# Patient Record
Sex: FEMALE | Race: WHITE
[De-identification: names, ages, dates, MRNs, and addresses within clinical notes are randomized per-mention and may not be internally consistent; named-entity substitution may affect disease eponyms.]

---

## 2018-02-15 ENCOUNTER — HOSPITAL ENCOUNTER (INPATIENT)
Dept: HOSPITAL 83 - ED | Age: 68
LOS: 2 days | Discharge: HOME | DRG: 871 | End: 2018-02-17
Attending: INTERNAL MEDICINE | Admitting: INTERNAL MEDICINE
Payer: MEDICARE

## 2018-02-15 VITALS — BODY MASS INDEX: 38.13 KG/M2 | WEIGHT: 215.19 LBS | HEIGHT: 62.99 IN

## 2018-02-15 VITALS — DIASTOLIC BLOOD PRESSURE: 58 MMHG | SYSTOLIC BLOOD PRESSURE: 187 MMHG

## 2018-02-15 VITALS — DIASTOLIC BLOOD PRESSURE: 69 MMHG

## 2018-02-15 VITALS — SYSTOLIC BLOOD PRESSURE: 171 MMHG | DIASTOLIC BLOOD PRESSURE: 68 MMHG

## 2018-02-15 VITALS — DIASTOLIC BLOOD PRESSURE: 81 MMHG | SYSTOLIC BLOOD PRESSURE: 166 MMHG

## 2018-02-15 VITALS — DIASTOLIC BLOOD PRESSURE: 68 MMHG

## 2018-02-15 VITALS — DIASTOLIC BLOOD PRESSURE: 76 MMHG

## 2018-02-15 VITALS — DIASTOLIC BLOOD PRESSURE: 59 MMHG

## 2018-02-15 DIAGNOSIS — Z79.4: ICD-10-CM

## 2018-02-15 DIAGNOSIS — E11.42: ICD-10-CM

## 2018-02-15 DIAGNOSIS — E80.6: ICD-10-CM

## 2018-02-15 DIAGNOSIS — E66.01: ICD-10-CM

## 2018-02-15 DIAGNOSIS — E83.51: ICD-10-CM

## 2018-02-15 DIAGNOSIS — Z88.9: ICD-10-CM

## 2018-02-15 DIAGNOSIS — E53.9: ICD-10-CM

## 2018-02-15 DIAGNOSIS — E78.5: ICD-10-CM

## 2018-02-15 DIAGNOSIS — A41.9: Primary | ICD-10-CM

## 2018-02-15 DIAGNOSIS — K21.9: ICD-10-CM

## 2018-02-15 DIAGNOSIS — K52.9: ICD-10-CM

## 2018-02-15 DIAGNOSIS — Z96.622: ICD-10-CM

## 2018-02-15 DIAGNOSIS — Z88.0: ICD-10-CM

## 2018-02-15 DIAGNOSIS — E11.43: ICD-10-CM

## 2018-02-15 DIAGNOSIS — E53.8: ICD-10-CM

## 2018-02-15 DIAGNOSIS — K31.84: ICD-10-CM

## 2018-02-15 DIAGNOSIS — Z98.891: ICD-10-CM

## 2018-02-15 DIAGNOSIS — I11.0: ICD-10-CM

## 2018-02-15 DIAGNOSIS — D72.810: ICD-10-CM

## 2018-02-15 DIAGNOSIS — Z83.3: ICD-10-CM

## 2018-02-15 DIAGNOSIS — E78.1: ICD-10-CM

## 2018-02-15 DIAGNOSIS — R81: ICD-10-CM

## 2018-02-15 DIAGNOSIS — I50.32: ICD-10-CM

## 2018-02-15 DIAGNOSIS — R82.4: ICD-10-CM

## 2018-02-15 DIAGNOSIS — K29.71: ICD-10-CM

## 2018-02-15 DIAGNOSIS — Z90.49: ICD-10-CM

## 2018-02-15 DIAGNOSIS — I31.3: ICD-10-CM

## 2018-02-15 DIAGNOSIS — R80.9: ICD-10-CM

## 2018-02-15 DIAGNOSIS — E43: ICD-10-CM

## 2018-02-15 DIAGNOSIS — D72.0: ICD-10-CM

## 2018-02-15 LAB
ALBUMIN SERPL-MCNC: 3.3 GM/DL (ref 3.1–4.5)
ALP SERPL-CCNC: 91 U/L (ref 45–117)
ALT SERPL W P-5'-P-CCNC: 27 U/L (ref 12–78)
APPEARANCE UR: (no result)
AST SERPL-CCNC: 33 IU/L (ref 3–35)
BACTERIA #/AREA URNS HPF: (no result) /[HPF]
BASOPHILS # BLD AUTO: 0.1 10*3/UL (ref 0–0.1)
BASOPHILS NFR BLD AUTO: 0.5 % (ref 0–1)
BILIRUB UR QL STRIP: NEGATIVE
BUN SERPL-MCNC: 15 MG/DL (ref 7–24)
CHLORIDE SERPL-SCNC: 104 MMOL/L (ref 98–107)
COLOR UR: YELLOW
CREAT SERPL-MCNC: 0.92 MG/DL (ref 0.55–1.02)
EOSINOPHIL # BLD AUTO: 0 10*3/UL (ref 0–0.4)
EOSINOPHIL # BLD AUTO: 0.2 % (ref 1–4)
EPI CELLS #/AREA URNS HPF: (no result) /[HPF]
ERYTHROCYTE [DISTWIDTH] IN BLOOD BY AUTOMATED COUNT: 12.4 % (ref 0–14.5)
GLUCOSE UR QL: (no result)
HCT VFR BLD AUTO: 39.6 % (ref 37–47)
HGB BLD-MCNC: 13.6 G/DL (ref 12–16)
HGB UR QL STRIP: (no result)
KETONES UR QL STRIP: (no result)
LEUKOCYTE ESTERASE UR QL STRIP: NEGATIVE
LIPASE SERPL-CCNC: 90 U/L (ref 73–393)
LYMPHOCYTES # BLD AUTO: 2.1 10*3/UL (ref 1.3–4.4)
LYMPHOCYTES NFR BLD AUTO: 19.1 % (ref 27–41)
MCH RBC QN AUTO: 28.6 PG (ref 27–31)
MCHC RBC AUTO-ENTMCNC: 34.3 G/DL (ref 33–37)
MCV RBC AUTO: 83.2 FL (ref 81–99)
MONOCYTES # BLD AUTO: 0.3 10*3/UL (ref 0.1–1)
MONOCYTES NFR BLD MANUAL: 3.1 % (ref 3–9)
NEUT #: 8.3 10*3/UL (ref 2.3–7.9)
NEUT %: 76.8 % (ref 47–73)
NITRITE UR QL STRIP: NEGATIVE
NRBC BLD QL AUTO: 0 % (ref 0–0)
PH UR STRIP: 7 [PH] (ref 5–9)
PLATELET # BLD AUTO: 198 10*3/UL (ref 130–400)
PMV BLD AUTO: 10.9 FL (ref 9.6–12.3)
POTASSIUM SERPL-SCNC: 4.6 MMOL/L (ref 3.5–5.1)
PROT SERPL-MCNC: 7 GM/DL (ref 6.4–8.2)
RBC # BLD AUTO: 4.76 10*6/UL (ref 4.1–5.1)
SODIUM SERPL-SCNC: 139 MMOL/L (ref 136–145)
SP GR UR: 1.02 (ref 1–1.03)
UROBILINOGEN UR STRIP-MCNC: 0.2 E.U./DL (ref 0.2–1)
WBC #/AREA URNS HPF: (no result) WBC/HPF (ref 0–5)
WBC NRBC COR # BLD AUTO: 10.8 10*3/UL (ref 4.8–10.8)

## 2018-02-16 VITALS — DIASTOLIC BLOOD PRESSURE: 49 MMHG | SYSTOLIC BLOOD PRESSURE: 156 MMHG

## 2018-02-16 VITALS — SYSTOLIC BLOOD PRESSURE: 162 MMHG | DIASTOLIC BLOOD PRESSURE: 70 MMHG

## 2018-02-16 VITALS — SYSTOLIC BLOOD PRESSURE: 158 MMHG | DIASTOLIC BLOOD PRESSURE: 58 MMHG

## 2018-02-16 VITALS — SYSTOLIC BLOOD PRESSURE: 142 MMHG | DIASTOLIC BLOOD PRESSURE: 67 MMHG

## 2018-02-16 VITALS — SYSTOLIC BLOOD PRESSURE: 161 MMHG | DIASTOLIC BLOOD PRESSURE: 55 MMHG

## 2018-02-16 VITALS — DIASTOLIC BLOOD PRESSURE: 92 MMHG

## 2018-02-16 VITALS — DIASTOLIC BLOOD PRESSURE: 52 MMHG

## 2018-02-16 LAB
25(OH)D3 SERPL-MCNC: 8.5 NG/ML (ref 30–100)
ALBUMIN SERPL-MCNC: 2.7 GM/DL (ref 3.1–4.5)
ALP SERPL-CCNC: 69 U/L (ref 45–117)
ALT SERPL W P-5'-P-CCNC: 19 U/L (ref 12–78)
APTT PPP: 23.9 SECONDS (ref 20.8–31.5)
AST SERPL-CCNC: 18 IU/L (ref 3–35)
BASOPHILS # BLD AUTO: 0.1 10*3/UL (ref 0–0.1)
BASOPHILS NFR BLD AUTO: 0.6 % (ref 0–1)
BUN SERPL-MCNC: 18 MG/DL (ref 7–24)
CHLORIDE SERPL-SCNC: 108 MMOL/L (ref 98–107)
CHOLEST SERPL-MCNC: 192 MG/DL (ref ?–200)
CREAT SERPL-MCNC: 0.93 MG/DL (ref 0.55–1.02)
EOSINOPHIL # BLD AUTO: 0.1 10*3/UL (ref 0–0.4)
EOSINOPHIL # BLD AUTO: 0.7 % (ref 1–4)
ERYTHROCYTE [DISTWIDTH] IN BLOOD BY AUTOMATED COUNT: 12.4 % (ref 0–14.5)
HCT VFR BLD AUTO: 33.6 % (ref 37–47)
HDLC SERPL-MCNC: 63 MG/DL (ref 40–60)
HGB BLD-MCNC: 11.8 G/DL (ref 12–16)
INR BLD: 1 (ref 2–3.5)
LDLC SERPL DIRECT ASSAY-MCNC: 87 MG/DL (ref 9–159)
LIPASE SERPL-CCNC: 91 U/L (ref 73–393)
LYMPHOCYTES # BLD AUTO: 3 10*3/UL (ref 1.3–4.4)
LYMPHOCYTES NFR BLD AUTO: 29.7 % (ref 27–41)
MCH RBC QN AUTO: 29.4 PG (ref 27–31)
MCHC RBC AUTO-ENTMCNC: 35.1 G/DL (ref 33–37)
MCV RBC AUTO: 83.8 FL (ref 81–99)
MONOCYTES # BLD AUTO: 0.7 10*3/UL (ref 0.1–1)
MONOCYTES NFR BLD MANUAL: 7.2 % (ref 3–9)
NEUT #: 6.2 10*3/UL (ref 2.3–7.9)
NEUT %: 61.6 % (ref 47–73)
NRBC BLD QL AUTO: 0 10*3/UL (ref 0–0)
PHOSPHATE SERPL-MCNC: 2.4 MG/DL (ref 2.5–4.9)
PLATELET # BLD AUTO: 170 10*3/UL (ref 130–400)
PMV BLD AUTO: 10.8 FL (ref 9.6–12.3)
POTASSIUM SERPL-SCNC: 3.5 MMOL/L (ref 3.5–5.1)
PROT SERPL-MCNC: 5.7 GM/DL (ref 6.4–8.2)
RBC # BLD AUTO: 4.01 10*6/UL (ref 4.1–5.1)
SODIUM SERPL-SCNC: 144 MMOL/L (ref 136–145)
T4 FREE SERPL-MCNC: 1.11 NG/DL (ref 0.76–1.46)
TRIGL SERPL-MCNC: 211 MG/DL (ref ?–150)
TSH SERPL DL<=0.005 MIU/L-ACNC: 0.2 UIU/ML (ref 0.36–4.75)
VITAMIN B12: 197 PG/ML (ref 247–911)
VLDLC SERPL CALC-MCNC: 42 MG/DL (ref 6–40)
WBC NRBC COR # BLD AUTO: 10 10*3/UL (ref 4.8–10.8)

## 2018-02-16 PROCEDURE — 0DB68ZX EXCISION OF STOMACH, VIA NATURAL OR ARTIFICIAL OPENING ENDOSCOPIC, DIAGNOSTIC: ICD-10-PCS | Performed by: INTERNAL MEDICINE

## 2018-02-17 VITALS — DIASTOLIC BLOOD PRESSURE: 80 MMHG

## 2018-02-17 VITALS — SYSTOLIC BLOOD PRESSURE: 155 MMHG | DIASTOLIC BLOOD PRESSURE: 51 MMHG

## 2018-02-17 VITALS — DIASTOLIC BLOOD PRESSURE: 64 MMHG | SYSTOLIC BLOOD PRESSURE: 148 MMHG

## 2022-10-06 ENCOUNTER — HOSPITAL ENCOUNTER (OUTPATIENT)
Age: 72
Discharge: HOME | End: 2022-10-08

## 2022-10-06 LAB
ABO/RH: NORMAL
ANTIBODY SCREEN: NORMAL

## 2022-10-06 PROCEDURE — 86900 BLOOD TYPING SEROLOGIC ABO: CPT

## 2022-10-06 PROCEDURE — 86850 RBC ANTIBODY SCREEN: CPT

## 2022-10-06 PROCEDURE — 86901 BLOOD TYPING SEROLOGIC RH(D): CPT

## 2022-10-10 ENCOUNTER — TELEPHONE (OUTPATIENT)
Dept: CARDIOLOGY CLINIC | Age: 72
End: 2022-10-10

## 2022-10-10 ENCOUNTER — OFFICE VISIT (OUTPATIENT)
Dept: CARDIOLOGY CLINIC | Age: 72
End: 2022-10-10
Payer: MEDICARE

## 2022-10-10 VITALS
BODY MASS INDEX: 40.34 KG/M2 | HEART RATE: 67 BPM | DIASTOLIC BLOOD PRESSURE: 81 MMHG | SYSTOLIC BLOOD PRESSURE: 138 MMHG | WEIGHT: 219.2 LBS | RESPIRATION RATE: 16 BRPM | HEIGHT: 62 IN

## 2022-10-10 DIAGNOSIS — E85.4 CARDIAC AMYLOIDOSIS (HCC): Primary | ICD-10-CM

## 2022-10-10 DIAGNOSIS — I43 CARDIAC AMYLOIDOSIS (HCC): Primary | ICD-10-CM

## 2022-10-10 DIAGNOSIS — N18.30 STAGE 3 CHRONIC KIDNEY DISEASE, UNSPECIFIED WHETHER STAGE 3A OR 3B CKD (HCC): ICD-10-CM

## 2022-10-10 DIAGNOSIS — Z01.810 PREOP CARDIOVASCULAR EXAM: Primary | ICD-10-CM

## 2022-10-10 DIAGNOSIS — R93.1 ABNORMAL ECHOCARDIOGRAM: ICD-10-CM

## 2022-10-10 DIAGNOSIS — M25.569 KNEE PAIN, UNSPECIFIED CHRONICITY, UNSPECIFIED LATERALITY: ICD-10-CM

## 2022-10-10 PROCEDURE — 93000 ELECTROCARDIOGRAM COMPLETE: CPT | Performed by: INTERNAL MEDICINE

## 2022-10-10 PROCEDURE — 99204 OFFICE O/P NEW MOD 45 MIN: CPT | Performed by: INTERNAL MEDICINE

## 2022-10-10 PROCEDURE — 1123F ACP DISCUSS/DSCN MKR DOCD: CPT | Performed by: INTERNAL MEDICINE

## 2022-10-10 RX ORDER — LOSARTAN POTASSIUM 100 MG/1
TABLET ORAL
COMMUNITY

## 2022-10-10 RX ORDER — AMLODIPINE BESYLATE 5 MG/1
5 TABLET ORAL DAILY
COMMUNITY

## 2022-10-10 RX ORDER — CARVEDILOL 12.5 MG/1
TABLET ORAL
COMMUNITY

## 2022-10-10 RX ORDER — ONDANSETRON HYDROCHLORIDE 8 MG/1
TABLET, FILM COATED ORAL EVERY 8 HOURS
COMMUNITY

## 2022-10-10 RX ORDER — ASPIRIN 81 MG/1
81 TABLET ORAL PRN
COMMUNITY

## 2022-10-10 RX ORDER — LEVOTHYROXINE SODIUM 112 UG/1
TABLET ORAL
COMMUNITY

## 2022-10-10 RX ORDER — ERGOCALCIFEROL 1.25 MG/1
50000 CAPSULE ORAL WEEKLY
COMMUNITY

## 2022-10-10 RX ORDER — GABAPENTIN 100 MG/1
100 CAPSULE ORAL 2 TIMES DAILY
COMMUNITY

## 2022-10-10 RX ORDER — SIMVASTATIN 20 MG
TABLET ORAL
COMMUNITY

## 2022-10-10 RX ORDER — GABAPENTIN 300 MG/1
300 CAPSULE ORAL
COMMUNITY
Start: 2014-07-01

## 2022-10-10 NOTE — PROGRESS NOTES
Chief Complaint   Patient presents with    Cardiac Clearance       Patient Active Problem List    Diagnosis Date Noted    Stage 3 chronic kidney disease ([facility] [address] 10/10/2022     Priority: Medium    Knee pain 10/10/2022    Abnormal echocardiogram 10/10/2022     Overview Note:     FER TTE 2/15/2018 Niall Jay):  Severe LVH, moderate pericardial effusion, suspicious for amyloid         Current Outpatient Medications   Medication Sig Dispense Refill    simvastatin (ZOCOR) 20 MG tablet simvastatin 20 mg tablet   Take 1 tablet every day by oral route. carvedilol (COREG) 12.5 MG tablet carvedilol 12.5 mg tablet   Take 1 tablet twice a day by oral route. losartan (COZAAR) 100 MG tablet losartan 100 mg tablet   Take 1 tablet every day by oral route. Dulaglutide (TRULICITY SC) Inject into the skin once a week      Insulin Glargine (TOUJEO MAX SOLOSTAR SC) Inject into the skin 20 units in the A.M.  60 units at bedtime      gabapentin (NEURONTIN) 300 MG capsule Take 300 mg by mouth nightly as needed. 2 TABLETS AT BEDTIME      gabapentin (NEURONTIN) 100 MG capsule Take 100 mg by mouth in the morning and at bedtime. ondansetron (ZOFRAN) 8 MG tablet every 8 (eight) hours      vitamin D (ERGOCALCIFEROL) 1.25 MG (73505 UT) CAPS capsule Take 50,000 Units by mouth once a week      levothyroxine (SYNTHROID) 112 MCG tablet Synthroid 112 mcg tablet   Take 1 tablet every day by oral route. aspirin 81 MG EC tablet Take 81 mg by mouth as needed for Pain      amLODIPine (NORVASC) 5 MG tablet Take 5 mg by mouth daily       No current facility-administered medications for this visit.         Allergies   Allergen Reactions    Morphine Nausea Only    Penicillins Other (See Comments)     VAGINAL YEAST INFECTION       Phenergan [Promethazine]     Pyridium [Phenazopyridine]        Vitals:    10/10/22 1326   BP: 138/81   Pulse: 67   Resp: 16   Weight: 219 lb 3.2 oz (99.4 kg)   Height: 5' 2\" (1.575 m)       Social History Socioeconomic History    Marital status:      Spouse name: Not on file    Number of children: Not on file    Years of education: Not on file    Highest education level: Not on file   Occupational History    Not on file   Tobacco Use    Smoking status: Never    Smokeless tobacco: Never   Vaping Use    Vaping Use: Never used   Substance and Sexual Activity    Alcohol use: Never    Drug use: Never    Sexual activity: Not on file   Other Topics Concern    Not on file   Social History Narrative    Not on file     Social Determinants of Health     Financial Resource Strain: Not on file   Food Insecurity: Not on file   Transportation Needs: Not on file   Physical Activity: Not on file   Stress: Not on file   Social Connections: Not on file   Intimate Partner Violence: Not on file   Housing Stability: Not on file       Family History   Problem Relation Age of Onset    Diabetes Maternal Grandmother     Diabetes Maternal Grandfather          SUBJECTIVE: Shaylee Ibrahim presents to the office today for consult for pre-op evaluation prior to hip surgery at [facility]  She complains of no new or unstable cardiac symptoms, albeit at a VERY low level of activity  and denies chest pain, orthopnea, and syncope. Review of old records indicates an echo at [facility] in 2018 calling severe LVH, moderate pericardial effusion, suspicious for [facility], and CMR recommended - was never done. She is very debilitated due to hip pain. Luisa Balling daughter here today            Physical Exam   /81   Pulse 67   Resp 16   Ht 5' 2\" (1.575 m)   Wt 219 lb 3.2 oz (99.4 kg)   BMI 40.09 kg/m²   Constitutional: Oriented to person, place, and time. Obese No distress. Head: Normocephalic and atraumatic. Neck: Normal range of motion. Neck supple. no JVD present. Cardiovascular: Normal rate, regular rhythm, normal heart sounds and intact distal pulses. No murmur heard.   Pulmonary/Chest: Effort normal and breath sounds normal.    Abdominal: Soft. Bowel sounds are normal.  Musculoskeletal: No edema and no tenderness. Neurological: Alert and oriented to person, place, and time. Skin: Skin is warm and dry. Psychiatric: Normal mood and affect. Behavior is normal.     EKG:  normal sinus rhythm, 1st degree AV block, low voltage, septal MI pattern .     ASSESSMENT AND PLAN:  Patient Active Problem List   Diagnosis    Knee pain    Abnormal echocardiogram    Stage 3 chronic kidney disease ([address]        Patient has no high risk clinical predictors of an adverse outcome in surgery, such as recent myocardial infarction, unstable angina, heart failure, rhythm other than sinus, severe obstructive valvular disease,cva, insulin, ckd  UNABLE to achieve 4 METS with AODLs  Abnormal ekg today and abnormal echo 2018 suspicious for amyloid cardiomyopathy  Patient is NOT cleared for hip surgery at this point  CMR with gadolinium to be obtained    Wai Odom M.D  [facility] Cardiology

## 2022-10-18 NOTE — TELEPHONE ENCOUNTER
Emigdio Samuels is requesting a peer to peer. Case #[id number]. Phone #[phone]. [address]... Sherrie Vyas

## 2022-11-01 ENCOUNTER — TELEPHONE (OUTPATIENT)
Dept: CARDIOLOGY CLINIC | Age: 72
End: 2022-11-01

## 2022-11-01 NOTE — TELEPHONE ENCOUNTER
Patients cardiac MRI denied from insurance -    What are the next steps to clear her for her surgery?

## 2022-11-02 NOTE — TELEPHONE ENCOUNTER
Patient then notified and instructed on referral to [facility]. She stated she would let us know. She was not sure she wanted to have this appointment at this time.   She will call me back and let me know what she decides

## 2022-12-07 PROBLEM — G56.22 CUBITAL TUNNEL SYNDROME ON LEFT: Status: ACTIVE | Noted: 2022-12-07

## 2022-12-07 PROBLEM — E78.00 HYPERCHOLESTEROLEMIA: Status: ACTIVE | Noted: 2022-12-07

## 2022-12-07 PROBLEM — I10 HYPERTENSION: Status: ACTIVE | Noted: 2022-12-07

## 2022-12-07 PROBLEM — E11.9 DIABETES (HCC): Status: ACTIVE | Noted: 2022-12-07

## 2022-12-07 PROBLEM — R22.32 MASS OF LEFT ELBOW: Status: ACTIVE | Noted: 2022-12-07

## 2022-12-07 PROBLEM — M10.9 GOUT: Status: ACTIVE | Noted: 2022-12-07

## 2022-12-07 RX ORDER — GLIMEPIRIDE 4 MG/1
TABLET ORAL
COMMUNITY
End: 2022-12-08

## 2022-12-07 RX ORDER — TRAMADOL HYDROCHLORIDE 50 MG/1
TABLET ORAL
COMMUNITY
Start: 2022-11-02

## 2022-12-07 RX ORDER — IBUPROFEN 800 MG/1
TABLET ORAL
COMMUNITY
End: 2022-12-08

## 2022-12-08 ENCOUNTER — OFFICE VISIT (OUTPATIENT)
Dept: CARDIOLOGY CLINIC | Age: 72
End: 2022-12-08
Payer: MEDICARE

## 2022-12-08 VITALS
SYSTOLIC BLOOD PRESSURE: 144 MMHG | BODY MASS INDEX: 40.12 KG/M2 | RESPIRATION RATE: 20 BRPM | DIASTOLIC BLOOD PRESSURE: 74 MMHG | HEIGHT: 62 IN | WEIGHT: 218 LBS | HEART RATE: 68 BPM

## 2022-12-08 DIAGNOSIS — I43 CARDIAC AMYLOIDOSIS (HCC): Primary | ICD-10-CM

## 2022-12-08 DIAGNOSIS — E85.4 CARDIAC AMYLOIDOSIS (HCC): Primary | ICD-10-CM

## 2022-12-08 DIAGNOSIS — R06.09 DOE (DYSPNEA ON EXERTION): ICD-10-CM

## 2022-12-08 PROCEDURE — 93000 ELECTROCARDIOGRAM COMPLETE: CPT | Performed by: INTERNAL MEDICINE

## 2022-12-08 PROCEDURE — 3078F DIAST BP <80 MM HG: CPT | Performed by: INTERNAL MEDICINE

## 2022-12-08 PROCEDURE — 99214 OFFICE O/P EST MOD 30 MIN: CPT | Performed by: INTERNAL MEDICINE

## 2022-12-08 PROCEDURE — 1123F ACP DISCUSS/DSCN MKR DOCD: CPT | Performed by: INTERNAL MEDICINE

## 2022-12-08 PROCEDURE — 3075F SYST BP GE 130 - 139MM HG: CPT | Performed by: INTERNAL MEDICINE

## 2022-12-08 NOTE — PROGRESS NOTES
CHIEF COMPLAINT: Pre-op    HISTORY OF PRESENT ILLNESS: Patient is a 80 y.o. female seen at the request of Reena Moses DO. For hip replacement. Presents for pre-op. Very limited functional capacity. Notes VELAZQUEZ. No CP or angina. Past Medical History:   Diagnosis Date    Diabetes mellitus ([facility] [address]     Hypertension     Thyroid disease        Patient Active Problem List   Diagnosis    Right leg pain    Abnormal echocardiogram    Stage 3 chronic kidney disease (HCC)    Spinal stenosis    Mass of left elbow    Hypertension    Hypercholesterolemia    HNP (herniated nucleus pulposus), cervical    Gout    Diabetes (HCC)    Cubital tunnel syndrome on left       Allergies   Allergen Reactions    Morphine Nausea Only    Penicillins Other (See Comments)     VAGINAL YEAST INFECTION       Phenazopyridine Other (See Comments)    Promethazine Other (See Comments)       Current Outpatient Medications   Medication Sig Dispense Refill    traMADol (ULTRAM) 50 MG tablet TAKE 1 TO 2 TABLETS BY MOUTH TWICE DAILY AS NEEDED FOR PAIN      simvastatin (ZOCOR) 20 MG tablet simvastatin 20 mg tablet   Take 1 tablet every day by oral route. carvedilol (COREG) 12.5 MG tablet carvedilol 12.5 mg tablet   Take 1 tablet twice a day by oral route. losartan (COZAAR) 100 MG tablet losartan 100 mg tablet   Take 1 tablet every day by oral route. Dulaglutide (TRULICITY SC) Inject into the skin once a week      Insulin Glargine (TOUJEO MAX SOLOSTAR SC) Inject into the skin 20 units in the A.M.  60 units at bedtime      gabapentin (NEURONTIN) 300 MG capsule Take 300 mg by mouth nightly as needed. 2 TABLETS AT BEDTIME      gabapentin (NEURONTIN) 100 MG capsule Take 100 mg by mouth in the morning and at bedtime.       ondansetron (ZOFRAN) 8 MG tablet every 8 (eight) hours      vitamin D (ERGOCALCIFEROL) 1.25 MG (42956 UT) CAPS capsule Take 50,000 Units by mouth once a week      levothyroxine (SYNTHROID) 112 MCG tablet Synthroid 112 mcg tablet   Take 1 tablet every day by oral route. aspirin 81 MG EC tablet Take 81 mg by mouth as needed for Pain      amLODIPine (NORVASC) 5 MG tablet Take 5 mg by mouth daily       No current facility-administered medications for this visit. Social History     Socioeconomic History    Marital status:      Spouse name: Not on file    Number of children: Not on file    Years of education: Not on file    Highest education level: Not on file   Occupational History    Not on file   Tobacco Use    Smoking status: Never    Smokeless tobacco: Never   Vaping Use    Vaping Use: Never used   Substance and Sexual Activity    Alcohol use: Never    Drug use: Never    Sexual activity: Not on file   Other Topics Concern    Not on file   Social History Narrative    Not on file     Social Determinants of Health     Financial Resource Strain: Not on file   Food Insecurity: Not on file   Transportation Needs: Not on file   Physical Activity: Not on file   Stress: Not on file   Social Connections: Not on file   Intimate Partner Violence: Not on file   Housing Stability: Not on file       Family History   Problem Relation Age of Onset    Diabetes Maternal Grandmother     Diabetes Maternal Grandfather        Review of Systems:  Heart: as above   Lungs: as above   Eyes: denies changes in vision or discharge. Ears: denies changes in hearing or pain. Nose: denies epistaxis or masses   Throat: denies sore throat or trouble swallowing. Neuro: denies numbness, tingling, tremors. Skin: denies rashes or itching. : denies hematuria, dysuria   GI: denies vomiting, diarrhea   Psych: denies mood changed, anxiety, depression. All other systems negative. Physical Exam   BP (!) 144/74   Pulse 68   Resp 20   Ht 5' 2\" (1.575 m)   Wt 218 lb (98.9 kg)   BMI 39.87 kg/m²   Constitutional: Oriented to person, place, and time. Well-developed and well-nourished. No distress. Head: Normocephalic and atraumatic. Eyes: EOM are normal. Pupils are equal, round, and reactive to light. Neck: Normal range of motion. Neck supple. No hepatojugular reflux and no JVD present. Carotid bruit is not present. No tracheal deviation present. No thyromegaly present. Cardiovascular: Normal rate, regular rhythm, normal heart sounds and intact distal pulses. Exam reveals no gallop and no friction rub. No murmur heard. Pulmonary/Chest: Effort normal and breath sounds normal. No respiratory distress. No wheezes. No rales. No tenderness. Abdominal: Soft. Bowel sounds are normal. No distension and no mass. No tenderness. No rebound and no guarding. Musculoskeletal: Normal range of motion. No edema and no tenderness. Lymphadenopathy:   No cervical adenopathy. No groin adenopathy. Neurological: Alert and oriented to person, place, and time. Skin: Skin is warm and dry. No rash noted. Not diaphoretic. No erythema. Psychiatric: Normal mood and affect. Behavior is normal.     EKG personally reviewed 12/08/22:  normal sinus rhythm, nonspecific ST and T waves changes, Low voltage. ASSESSMENT AND PLAN:  Patient Active Problem List   Diagnosis    Right leg pain    Abnormal echocardiogram    Stage 3 chronic kidney disease (HCC)    Spinal stenosis    Mass of left elbow    Hypertension    Hypercholesterolemia    HNP (herniated nucleus pulposus), cervical    Gout    Diabetes ([address]    Cubital tunnel syndrome on left     1. Pre-op/VELAZQUEZ:    Abnormal EKG. Very limited functional capacity. Echo. Arrange pharm stress to risk stratify. 2. HTN: Observe. 3. Lipids: Statin. 4. DM: Per primary service. 5. CKD: Follow labs. Raegan Dowling D.O.   Cardiologist  Cardiology, [facility]

## 2023-03-24 ENCOUNTER — HOSPITAL ENCOUNTER (OUTPATIENT)
Age: 73
Discharge: HOME | End: 2023-03-26

## 2023-03-24 ENCOUNTER — TELEPHONE (OUTPATIENT)
Dept: CARDIOLOGY CLINIC | Age: 73
End: 2023-03-24

## 2023-03-24 LAB
ABO + RH BLD: NORMAL
BLD GP AB SCN SERPL QL: NORMAL

## 2023-03-24 PROCEDURE — 86850 RBC ANTIBODY SCREEN: CPT

## 2023-03-24 PROCEDURE — 86900 BLOOD TYPING SEROLOGIC ABO: CPT

## 2023-03-24 PROCEDURE — 86901 BLOOD TYPING SEROLOGIC RH(D): CPT

## 2023-03-24 NOTE — TELEPHONE ENCOUNTER
Low risk stress 12/12/2022. Patient is an acceptable risk to proceed. Donna Yun D.O.   Cardiologist  Cardiology, [facility]

## 2023-03-24 NOTE — TELEPHONE ENCOUNTER
Patient was seen in December, echo and stress ordered, Hip surgery is scheduled on 3/28, is patient cleared for surgery?

## 2023-03-29 ENCOUNTER — HOSPITAL ENCOUNTER (OUTPATIENT)
Age: 73
Discharge: HOME | End: 2023-03-31

## 2023-03-29 LAB
ANION GAP SERPL CALCULATED.3IONS-SCNC: 10 MMOL/L (ref 7–16)
BUN SERPL-MCNC: 21 MG/DL (ref 6–23)
CALCIUM SERPL-MCNC: 8.4 MG/DL (ref 8.6–10.2)
CHLORIDE SERPL-SCNC: 110 MMOL/L (ref 98–107)
CO2 SERPL-SCNC: 23 MMOL/L (ref 22–29)
CREAT SERPL-MCNC: 1 MG/DL (ref 0.5–1)
ERYTHROCYTE [DISTWIDTH] IN BLOOD BY AUTOMATED COUNT: 12.5 FL (ref 11.5–15)
GLUCOSE SERPL-MCNC: 199 MG/DL (ref 74–99)
HCT VFR BLD AUTO: 38.2 % (ref 34–48)
HGB BLD-MCNC: 12.3 G/DL (ref 11.5–15.5)
MCH RBC QN AUTO: 28.6 PG (ref 26–35)
MCHC RBC AUTO-ENTMCNC: 32.2 % (ref 32–34.5)
MCV RBC AUTO: 88.8 FL (ref 80–99.9)
PLATELET # BLD AUTO: 223 E9/L (ref 130–450)
PMV BLD AUTO: 10.5 FL (ref 7–12)
POTASSIUM SERPL-SCNC: 4.6 MMOL/L (ref 3.5–5)
RBC # BLD AUTO: 4.3 E12/L (ref 3.5–5.5)
SODIUM SERPL-SCNC: 143 MMOL/L (ref 132–146)
WBC # BLD: 13 E9/L (ref 4.5–11.5)

## 2023-03-29 PROCEDURE — 80048 BASIC METABOLIC PNL TOTAL CA: CPT

## 2023-03-29 PROCEDURE — 85027 COMPLETE CBC AUTOMATED: CPT

## 2023-03-30 ENCOUNTER — HOSPITAL ENCOUNTER (OUTPATIENT)
Age: 73
Discharge: HOME | End: 2023-04-01

## 2023-03-30 LAB
ANION GAP SERPL CALCULATED.3IONS-SCNC: 9 MMOL/L (ref 7–16)
BUN SERPL-MCNC: 21 MG/DL (ref 6–23)
CALCIUM SERPL-MCNC: 8.4 MG/DL (ref 8.6–10.2)
CHLORIDE SERPL-SCNC: 104 MMOL/L (ref 98–107)
CO2 SERPL-SCNC: 23 MMOL/L (ref 22–29)
CREAT SERPL-MCNC: 1.1 MG/DL (ref 0.5–1)
ERYTHROCYTE [DISTWIDTH] IN BLOOD BY AUTOMATED COUNT: 12.8 FL (ref 11.5–15)
GLUCOSE SERPL-MCNC: 173 MG/DL (ref 74–99)
HCT VFR BLD AUTO: 32 % (ref 34–48)
HGB BLD-MCNC: 10.3 G/DL (ref 11.5–15.5)
MCH RBC QN AUTO: 29 PG (ref 26–35)
MCHC RBC AUTO-ENTMCNC: 32.2 % (ref 32–34.5)
MCV RBC AUTO: 90.1 FL (ref 80–99.9)
PLATELET # BLD AUTO: 200 E9/L (ref 130–450)
PMV BLD AUTO: 10.7 FL (ref 7–12)
POTASSIUM SERPL-SCNC: 3.9 MMOL/L (ref 3.5–5)
RBC # BLD AUTO: 3.55 E12/L (ref 3.5–5.5)
SODIUM SERPL-SCNC: 136 MMOL/L (ref 132–146)
WBC # BLD: 12.5 E9/L (ref 4.5–11.5)

## 2023-03-30 PROCEDURE — 80048 BASIC METABOLIC PNL TOTAL CA: CPT

## 2023-03-30 PROCEDURE — 85027 COMPLETE CBC AUTOMATED: CPT

## 2023-10-02 PROBLEM — S72.352D: Status: ACTIVE | Noted: 2023-10-02

## 2023-10-02 PROBLEM — M17.9 OSTEOARTHRITIS OF KNEE: Status: ACTIVE | Noted: 2023-10-02

## 2023-10-02 PROBLEM — K08.89 TOOTHACHE: Status: ACTIVE | Noted: 2023-10-02

## 2023-10-02 PROBLEM — I65.29 CAROTID ARTERY OCCLUSION: Status: ACTIVE | Noted: 2023-10-02

## 2023-10-02 PROBLEM — M47.817 LUMBOSACRAL SPONDYLOSIS WITHOUT MYELOPATHY: Status: ACTIVE | Noted: 2023-10-02

## 2023-10-02 PROBLEM — R22.2 LOCALIZED SWELLING, MASS AND LUMP, TRUNK: Status: ACTIVE | Noted: 2023-10-02

## 2023-10-02 PROBLEM — E03.9 ACQUIRED HYPOTHYROIDISM: Status: ACTIVE | Noted: 2023-10-02

## 2023-10-02 PROBLEM — E66.9 OBESITY: Status: ACTIVE | Noted: 2023-10-02

## 2023-10-02 PROBLEM — E06.3 AUTOIMMUNE THYROIDITIS: Status: ACTIVE | Noted: 2023-10-02

## 2023-10-02 PROBLEM — E55.9 VITAMIN D DEFICIENCY: Status: ACTIVE | Noted: 2023-10-02

## 2023-10-02 PROBLEM — E11.40 DIABETIC NEUROPATHY (HCC): Status: ACTIVE | Noted: 2023-10-02

## 2023-10-02 PROBLEM — G25.81 RESTLESS LEGS SYNDROME: Status: ACTIVE | Noted: 2023-10-02

## 2023-10-02 PROBLEM — M54.50 LOW BACK PAIN: Status: ACTIVE | Noted: 2023-10-02

## 2023-10-02 PROBLEM — E11.42 POLYNEUROPATHY DUE TO TYPE 2 DIABETES MELLITUS (HCC): Status: ACTIVE | Noted: 2023-10-02

## 2023-10-02 PROBLEM — S72.142A CLOSED INTERTROCHANTERIC FRACTURE OF LEFT FEMUR (HCC): Status: ACTIVE | Noted: 2023-10-02

## 2023-10-03 ENCOUNTER — OFFICE VISIT (OUTPATIENT)
Dept: CARDIOLOGY CLINIC | Age: 73
End: 2023-10-03
Payer: MEDICARE

## 2023-10-03 VITALS
SYSTOLIC BLOOD PRESSURE: 149 MMHG | DIASTOLIC BLOOD PRESSURE: 69 MMHG | HEART RATE: 63 BPM | BODY MASS INDEX: 38.83 KG/M2 | WEIGHT: 211 LBS | RESPIRATION RATE: 20 BRPM | HEIGHT: 62 IN

## 2023-10-03 DIAGNOSIS — R06.09 DOE (DYSPNEA ON EXERTION): Primary | ICD-10-CM

## 2023-10-03 PROCEDURE — G8400 PT W/DXA NO RESULTS DOC: HCPCS | Performed by: INTERNAL MEDICINE

## 2023-10-03 PROCEDURE — G8484 FLU IMMUNIZE NO ADMIN: HCPCS | Performed by: INTERNAL MEDICINE

## 2023-10-03 PROCEDURE — 1123F ACP DISCUSS/DSCN MKR DOCD: CPT | Performed by: INTERNAL MEDICINE

## 2023-10-03 PROCEDURE — 3074F SYST BP LT 130 MM HG: CPT | Performed by: INTERNAL MEDICINE

## 2023-10-03 PROCEDURE — 3078F DIAST BP <80 MM HG: CPT | Performed by: INTERNAL MEDICINE

## 2023-10-03 PROCEDURE — 1090F PRES/ABSN URINE INCON ASSESS: CPT | Performed by: INTERNAL MEDICINE

## 2023-10-03 PROCEDURE — 99214 OFFICE O/P EST MOD 30 MIN: CPT | Performed by: INTERNAL MEDICINE

## 2023-10-03 PROCEDURE — 1036F TOBACCO NON-USER: CPT | Performed by: INTERNAL MEDICINE

## 2023-10-03 PROCEDURE — 93000 ELECTROCARDIOGRAM COMPLETE: CPT | Performed by: INTERNAL MEDICINE

## 2023-10-03 PROCEDURE — G8417 CALC BMI ABV UP PARAM F/U: HCPCS | Performed by: INTERNAL MEDICINE

## 2023-10-03 PROCEDURE — G8427 DOCREV CUR MEDS BY ELIG CLIN: HCPCS | Performed by: INTERNAL MEDICINE

## 2023-10-03 PROCEDURE — 3017F COLORECTAL CA SCREEN DOC REV: CPT | Performed by: INTERNAL MEDICINE

## 2023-10-03 RX ORDER — DULAGLUTIDE 4.5 MG/.5ML
INJECTION, SOLUTION SUBCUTANEOUS
COMMUNITY
Start: 2023-08-24

## 2023-10-03 RX ORDER — INSULIN GLARGINE 300 U/ML
INJECTION, SOLUTION SUBCUTANEOUS
COMMUNITY
Start: 2023-07-10

## 2023-10-03 RX ORDER — FUROSEMIDE 20 MG/1
TABLET ORAL
COMMUNITY
Start: 2023-08-22

## 2023-10-03 RX ORDER — LEVOTHYROXINE SODIUM 0.12 MG/1
TABLET ORAL
COMMUNITY
Start: 2023-07-27

## 2023-10-03 NOTE — PROGRESS NOTES
normal 12/12/2022.     2. VHD: Mild AI/mod MS by 7/27/2023. 3. Pacer: Per [facility]. 4. HTN: Observe. 5. Lipids: Statin. 6. DM: Per primary service. 7. CKD: Follow labs. Available external charts reviewed. Available imaging and evaluations independently reviewed. Interviewed and discussed patient with available family. Rayray Olguin D.O.   Cardiologist  Cardiology, [id number] [facility]